# Patient Record
Sex: FEMALE | Race: BLACK OR AFRICAN AMERICAN
[De-identification: names, ages, dates, MRNs, and addresses within clinical notes are randomized per-mention and may not be internally consistent; named-entity substitution may affect disease eponyms.]

---

## 2018-07-03 ENCOUNTER — HOSPITAL ENCOUNTER (EMERGENCY)
Dept: HOSPITAL 5 - ED | Age: 78
LOS: 1 days | Discharge: HOME | End: 2018-07-04
Payer: MEDICARE

## 2018-07-03 DIAGNOSIS — E78.5: ICD-10-CM
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
## 2018-07-03 DIAGNOSIS — E11.65: ICD-10-CM

## 2018-07-03 DIAGNOSIS — K21.9: ICD-10-CM

## 2018-07-03 DIAGNOSIS — R42: ICD-10-CM

## 2018-07-03 DIAGNOSIS — R51: ICD-10-CM
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
## 2018-07-03 DIAGNOSIS — I10: ICD-10-CM
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
## 2018-07-03 DIAGNOSIS — Z79.82: ICD-10-CM
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
## 2018-07-03 DIAGNOSIS — S09.90XA: Primary | ICD-10-CM

## 2018-07-03 LAB
BUN SERPL-MCNC: 15 MG/DL (ref 7–17)
BUN/CREAT SERPL: 21 %
CALCIUM SERPL-MCNC: 8.8 MG/DL (ref 8.4–10.2)
HCT VFR BLD CALC: 37.1 % (ref 30.3–42.9)
HEMOLYSIS INDEX: 0
HGB BLD-MCNC: 11.8 GM/DL (ref 10.1–14.3)
MCH RBC QN AUTO: 28 PG (ref 28–32)
MCHC RBC AUTO-ENTMCNC: 32 % (ref 30–34)
MCV RBC AUTO: 88 FL (ref 79–97)
PLATELET # BLD: 251 K/MM3 (ref 140–440)
RBC # BLD AUTO: 4.2 M/MM3 (ref 3.65–5.03)

## 2018-07-03 PROCEDURE — 96374 THER/PROPH/DIAG INJ IV PUSH: CPT

## 2018-07-03 PROCEDURE — 72125 CT NECK SPINE W/O DYE: CPT

## 2018-07-03 PROCEDURE — 93005 ELECTROCARDIOGRAM TRACING: CPT

## 2018-07-03 PROCEDURE — 70450 CT HEAD/BRAIN W/O DYE: CPT

## 2018-07-03 PROCEDURE — 80048 BASIC METABOLIC PNL TOTAL CA: CPT

## 2018-07-03 PROCEDURE — 93010 ELECTROCARDIOGRAM REPORT: CPT

## 2018-07-03 PROCEDURE — 82962 GLUCOSE BLOOD TEST: CPT

## 2018-07-03 PROCEDURE — 85025 COMPLETE CBC W/AUTO DIFF WBC: CPT

## 2018-07-03 PROCEDURE — 36415 COLL VENOUS BLD VENIPUNCTURE: CPT

## 2018-07-03 PROCEDURE — 99284 EMERGENCY DEPT VISIT MOD MDM: CPT

## 2018-07-03 PROCEDURE — 84484 ASSAY OF TROPONIN QUANT: CPT

## 2018-07-03 PROCEDURE — 85007 BL SMEAR W/DIFF WBC COUNT: CPT

## 2018-07-03 NOTE — EMERGENCY DEPARTMENT REPORT
HPI





- General


Chief Complaint: Fall


Time Seen by Provider: 07/03/18 19:27





- HPI


HPI: 





78-year-old  female presents to the emergency department with a complaint 

of a headache after she slipped on some stairs and fell and hit her head.  Her 

grandson is here translating for her.  He says that his aunt witnessed the fall 

and the patient appeared to just missed a step and slid down about 2 steps in 

total.  While she did hit her head, there was no loss of consciousness.  Her 

only complaint is the headache and they deny any bleeding or laceration.  She 

did not take anything for her symptoms prior to presentation.  She has a 

history of hypertension and non-insulin-dependent diabetes.





ED Past Medical Hx





- Past Medical History


Previous Medical History?: Yes


Hx Hypertension: Yes


Hx CVA: Yes (cva no residual)


Hx Diabetes: Yes


Hx GERD: Yes


Additional medical history: HYPERLIPIDEMIA





- Surgical History


Past Surgical History?: Yes


Additional Surgical History: c section





- Social History


Smoking Status: Never Smoker


Substance Use Type: None





- Medications


Home Medications: 


 Home Medications











 Medication  Instructions  Recorded  Confirmed  Last Taken  Type


 


Amlodipine Besylate [Norvasc] 10 mg PO DAILY 09/02/14 01/29/16 1 Day Ago History





    ~01/28/16 


 


Aspirin [Aspirin BABY CHEW TAB] 81 mg PO QDAY 09/02/14 01/29/16 1 Day Ago 

History





    ~01/28/16 


 


Lisinopril/Hydrochlorothiazide 1 tab PO QDAY 09/02/14 01/29/16 1 Day Ago History





[Zestoretic 20-12.5 mg]    ~01/28/16 


 


Metformin HCl [Metformin HCl ER] 500 mg PO BID 09/02/14 01/29/16 1 Day Ago 

History





    ~01/28/16 


 


Simvastatin 40 mg PO QDAY 09/02/14 01/29/16 1 Day Ago History





    ~01/28/16 


 


glipiZIDE [Glipizide Xl] 10 mg PO QDAY 09/02/14 01/29/16 1 Day Ago History





    ~01/28/16 


 


Pantoprazole [Protonix] 20 mg PO BID #60 tablet. 09/03/14 01/29/16 1 Day Ago 

Rx





    ~01/28/16 


 


Pantoprazole [Protonix] 40 mg PO BID #60 tablet 01/30/16  Unknown Rx














ED Review of Systems


ROS: 


Stated complaint: fell


Other details as noted in HPI





Comment: All other systems reviewed and negative


Constitutional: denies: chills, fever


Eyes: denies: eye pain, eye discharge, vision change


ENT: denies: ear pain, throat pain


Respiratory: denies: cough, shortness of breath, wheezing


Cardiovascular: denies: chest pain, palpitations


Gastrointestinal: denies: abdominal pain, nausea, diarrhea


Genitourinary: denies: urgency, dysuria, discharge


Musculoskeletal: denies: back pain, joint swelling, arthralgia


Skin: denies: rash, lesions


Neurological: headache.  denies: numbness





Physical Exam





- Physical Exam


Vital Signs: 


 Vital Signs











  07/03/18





  19:18


 


Temperature 98.8 F


 


Pulse Rate 78


 


Respiratory 18





Rate 


 


Blood Pressure 171/68


 


O2 Sat by Pulse 96





Oximetry 














ED Course


 Vital Signs











  07/03/18





  19:18


 


Temperature 98.8 F


 


Pulse Rate 78


 


Respiratory 18





Rate 


 


Blood Pressure 171/68


 


O2 Sat by Pulse 96





Oximetry 














ED Medical Decision Making





- Lab Data


Result diagrams: 


 07/03/18 21:29





 07/03/18 21:29


Critical care attestation.: 


If time is entered above; I have spent that time in minutes in the direct care 

of this critically ill patient, excluding procedure time.








ED Disposition


Clinical Impression: 


 Dizziness, Hyperglycemia





Headache


Qualifiers:


 Headache type: unspecified Headache chronicity pattern: unspecified pattern 

Intractability: not intractable Qualified Code(s): R51 - Headache





Fall


Qualifiers:


 Encounter type: initial encounter Qualified Code(s): W19.XXXA - Unspecified 

fall, initial encounter





Minor head injury without loss of consciousness


Qualifiers:


 Encounter type: initial encounter Qualified Code(s): S09.90XA - Unspecified 

injury of head, initial encounter





Disposition: DC-01 TO HOME OR SELFCARE


Is pt being admited?: No


Condition: Stable


Instructions:  Acute Headache (ED), Minor Head Injury (ED), Dizziness (ED), 

Diabetic Hyperglycemia (ED)


Additional Instructions: 


Please follow-up with your primary care physician in the next few days.  Return 

to the emergency department with any worsening of your symptoms or any acute 

distress.





You can take Tylenol every 4 hours and ibuprofen every 6 hours, using weight-

based dosing, as needed for discomfort.





Please try and stay away from foods that are high in salt and caffeinated 

products to help with your blood pressure.  Keep a blood pressure log.





Try and stay away from foods that are high in sugar, carbohydrates and starches 

to help with your blood sugar.  Keep a blood sugar log.


Referrals: 


PRIMARY CARE,MD [Primary Care Provider] - ASAP


Time of Disposition: 00:15

## 2018-07-03 NOTE — CAT SCAN REPORT
FINAL REPORT



PROCEDURE:  CT CERVICAL SPINE WO CON



TECHNIQUE:  Computerized tomography of the cervical spine was

performed from the skull base to T1 without contrast material. 



HISTORY:  FALL and pain 



COMPARISON:  No prior studies are available for comparison.



FINDINGS:  

The vertebral body heights and alignment are maintained. No acute

fracture or subluxation is seen. There is a sclerotic density in

the left facet region of the C7 vertebral body, which appears

benign. There is ground-glass density in the left mandible,

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 



Prominent heterogeneous density of the right thyroid lobe with

calcification. 



IMPRESSION:  

No acute fracture or subluxation is identified.

## 2018-07-03 NOTE — CAT SCAN REPORT
FINAL REPORT



PROCEDURE:  CT HEAD/BRAIN WO CON



TECHNIQUE:  Computerized tomography of the head was performed

without contrast material. 



HISTORY:  FALL and pain 



COMPARISON:  No prior studies are available for comparison.



FINDINGS:  

There are diffuse involutional changes, with prominence of the

ventricles and the sulci. There is periventricular white matter

low attenuation, compatible with chronic microvascular ischemic

changes. There is no CT evidence of intracranial mass,

hemorrhage, acute territorial infarction, or hydrocephalus. The

intracranial arteries are symmetric in density. 



There is scalp soft tissue swelling near the vertex. No acute

fracture is seen. Visualized paranasal sinuses and mastoids are

aerated. 



IMPRESSION:  

No CT evidence of acute intracranial abnormality. Scalp soft

tissue swelling

## 2018-07-04 VITALS — DIASTOLIC BLOOD PRESSURE: 75 MMHG | SYSTOLIC BLOOD PRESSURE: 149 MMHG

## 2018-07-04 LAB
BAND NEUTROPHILS # (MANUAL): 0 K/MM3
MYELOCYTES # (MANUAL): 0 K/MM3
PROMYELOCYTES # (MANUAL): 0 K/MM3
TOTAL CELLS COUNTED BLD: 100